# Patient Record
Sex: MALE | Race: WHITE
[De-identification: names, ages, dates, MRNs, and addresses within clinical notes are randomized per-mention and may not be internally consistent; named-entity substitution may affect disease eponyms.]

---

## 2020-03-05 ENCOUNTER — HOSPITAL ENCOUNTER (OUTPATIENT)
Dept: HOSPITAL 75 - SDC | Age: 45
Discharge: HOME | End: 2020-03-05
Attending: SURGERY
Payer: COMMERCIAL

## 2020-03-05 VITALS — SYSTOLIC BLOOD PRESSURE: 106 MMHG | DIASTOLIC BLOOD PRESSURE: 73 MMHG

## 2020-03-05 VITALS — WEIGHT: 175.27 LBS | BODY MASS INDEX: 25.96 KG/M2 | HEIGHT: 68.9 IN

## 2020-03-05 VITALS — DIASTOLIC BLOOD PRESSURE: 77 MMHG | SYSTOLIC BLOOD PRESSURE: 113 MMHG

## 2020-03-05 VITALS — DIASTOLIC BLOOD PRESSURE: 91 MMHG | SYSTOLIC BLOOD PRESSURE: 129 MMHG

## 2020-03-05 VITALS — DIASTOLIC BLOOD PRESSURE: 64 MMHG | SYSTOLIC BLOOD PRESSURE: 117 MMHG

## 2020-03-05 VITALS — SYSTOLIC BLOOD PRESSURE: 127 MMHG | DIASTOLIC BLOOD PRESSURE: 92 MMHG

## 2020-03-05 VITALS — DIASTOLIC BLOOD PRESSURE: 89 MMHG | SYSTOLIC BLOOD PRESSURE: 129 MMHG

## 2020-03-05 VITALS — DIASTOLIC BLOOD PRESSURE: 82 MMHG | SYSTOLIC BLOOD PRESSURE: 115 MMHG

## 2020-03-05 VITALS — SYSTOLIC BLOOD PRESSURE: 117 MMHG | DIASTOLIC BLOOD PRESSURE: 86 MMHG

## 2020-03-05 VITALS — SYSTOLIC BLOOD PRESSURE: 119 MMHG | DIASTOLIC BLOOD PRESSURE: 83 MMHG

## 2020-03-05 VITALS — DIASTOLIC BLOOD PRESSURE: 90 MMHG | SYSTOLIC BLOOD PRESSURE: 120 MMHG

## 2020-03-05 DIAGNOSIS — K40.90: ICD-10-CM

## 2020-03-05 DIAGNOSIS — Z79.899: ICD-10-CM

## 2020-03-05 DIAGNOSIS — Z83.3: ICD-10-CM

## 2020-03-05 DIAGNOSIS — K42.0: Primary | ICD-10-CM

## 2020-03-05 DIAGNOSIS — F17.210: ICD-10-CM

## 2020-03-05 DIAGNOSIS — G43.909: ICD-10-CM

## 2020-03-05 DIAGNOSIS — Z82.49: ICD-10-CM

## 2020-03-05 DIAGNOSIS — D17.6: ICD-10-CM

## 2020-03-05 LAB
BASOPHILS # BLD AUTO: 0 10^3/UL (ref 0–0.1)
BASOPHILS NFR BLD AUTO: 1 % (ref 0–10)
EOSINOPHIL # BLD AUTO: 0.2 10^3/UL (ref 0–0.3)
EOSINOPHIL NFR BLD AUTO: 3 % (ref 0–10)
ERYTHROCYTE [DISTWIDTH] IN BLOOD BY AUTOMATED COUNT: 11.9 % (ref 10–14.5)
HCT VFR BLD CALC: 42 % (ref 40–54)
HGB BLD-MCNC: 15.1 G/DL (ref 13.3–17.7)
LYMPHOCYTES # BLD AUTO: 1.7 X 10^3 (ref 1–4)
LYMPHOCYTES NFR BLD AUTO: 26 % (ref 12–44)
MANUAL DIFFERENTIAL PERFORMED BLD QL: NO
MCH RBC QN AUTO: 32 PG (ref 25–34)
MCHC RBC AUTO-ENTMCNC: 36 G/DL (ref 32–36)
MCV RBC AUTO: 90 FL (ref 80–99)
MONOCYTES # BLD AUTO: 0.6 X 10^3 (ref 0–1)
MONOCYTES NFR BLD AUTO: 9 % (ref 0–12)
NEUTROPHILS # BLD AUTO: 3.8 X 10^3 (ref 1.8–7.8)
NEUTROPHILS NFR BLD AUTO: 61 % (ref 42–75)
PLATELET # BLD: 219 10^3/UL (ref 130–400)
PMV BLD AUTO: 10.4 FL (ref 7.4–10.4)
WBC # BLD AUTO: 6.3 10^3/UL (ref 4.3–11)

## 2020-03-05 PROCEDURE — 85025 COMPLETE CBC W/AUTO DIFF WBC: CPT

## 2020-03-05 PROCEDURE — 88302 TISSUE EXAM BY PATHOLOGIST: CPT

## 2020-03-05 PROCEDURE — 36415 COLL VENOUS BLD VENIPUNCTURE: CPT

## 2020-03-05 PROCEDURE — 87081 CULTURE SCREEN ONLY: CPT

## 2020-03-05 PROCEDURE — 94664 DEMO&/EVAL PT USE INHALER: CPT

## 2020-03-05 PROCEDURE — 88304 TISSUE EXAM BY PATHOLOGIST: CPT

## 2020-03-05 RX ADMIN — SODIUM CHLORIDE, SODIUM LACTATE, POTASSIUM CHLORIDE, AND CALCIUM CHLORIDE PRN MLS/HR: 600; 310; 30; 20 INJECTION, SOLUTION INTRAVENOUS at 07:45

## 2020-03-05 RX ADMIN — SODIUM CHLORIDE, SODIUM LACTATE, POTASSIUM CHLORIDE, AND CALCIUM CHLORIDE PRN MLS/HR: 600; 310; 30; 20 INJECTION, SOLUTION INTRAVENOUS at 09:46

## 2020-03-05 NOTE — PROGRESS NOTE
Progress Note


Assessment/Plan


Date Seen by Provider:  Mar 5, 2020


Time Seen by Provider:  15:32


Events since last exam


I have not seen this patient,, Dr. Clark called to report that he had operated 

on this patient this morning, inadvertently forgot to sign the hydrocodone 

7.5/325 prescription quantity of 30. He is unable to E prescribe as he has not 

yet been signed up for that. As such I transmitted a prescription for the 

aforementioned medication to [facility] pharmacy for [place] on Dr. Clark's behalf.


Assessment/Plan


As above





Vitals


Last set of Vitals Signs





Vital Signs








  Date Time  Temp Pulse Resp B/P (MAP) Pulse Ox O2 Delivery O2 Flow Rate FiO2


 


3/5/20 12:30 36.3 80 18 117/86 98 Room Air 0.00 











Labs


Laboratory Tests


3/5/20 06:30: 


White Blood Count 6.3, Red Blood Count 4.72, Hemoglobin 15.1, Hematocrit 42, 

Mean Corpuscular Volume 90, Mean Corpuscular Hemoglobin 32, Mean Corpuscular 

Hemoglobin Concent 36, Red Cell Distribution Width 11.9, Platelet Count 219, M

brooklynn Platelet Volume 10.4, Neutrophils (%) (Auto) 61, Lymphocytes (%) (Auto) 26, 

Monocytes (%) (Auto) 9, Eosinophils (%) (Auto) 3, Basophils (%) (Auto) 1, 

Neutrophils # (Auto) 3.8, Lymphocytes # (Auto) 1.7, Monocytes # (Auto) 0.6, 

Eosinophils # (Auto) 0.2, Basophils # (Auto) 0.0











CECILY MCCLAIN              Mar 5, 2020 15:34

## 2020-03-05 NOTE — PROGRESS NOTE-PRE OPERATIVE
Pre-Operative Progress Note


H&P Reviewed


The H&P was reviewed, patient examined and no changes noted.


Date Seen by Provider:  Mar 5, 2020


Time Seen by Provider:  07:42


Date H&P Reviewed:  Mar 5, 2020


Time H&P Reviewed:  07:42


Pre-Operative Diagnosis:  umbilical, right inguinal hernia











MADHU CHRISTIANSON DO               Mar 5, 2020 08:01

## 2020-03-05 NOTE — PROGRESS NOTE-POST OPERATIVE
Post-Operative Progess Note


Surgeon (s)/Assistant (s)


Surgeon


MADHU CHRISTIANSON DO


Assistant:  Dr. Chilel to assist in retraction dissection and closure.





Pre-Operative Diagnosis


umbilical, right inguinal hernia





Post-Operative Diagnosis





incarcerate umbilical hernia, right inguinal hernia, right cord lipoma





Procedure & Operative Findings


Date of Procedure


3/5/20


Procedure Performed/Findings


lap incarcerated umbilical hernia repair, open right inguinal hernia repair with

mesh and excision of cord lipoma.





PROCEDURE:


 lap incarcerated umbilical hernia repair, open right inguinal hernia repair 

with mesh and excision of cord lipoma.





COMPLICATIONS:


None. 


 


INDICATIONS:


The patient is a 44, male with an umbilical and right inguinal hernia, which


has continued to increase in size and cause discomfort. The


patient was explained the risk and benefits of the procedure and


wished to proceed with the procedure. Consent was signed on the


chart. 


 


DESCRIPTION OF PROCEDURE:


The patient was taken into the operating suite, prepped and draped in sterile


fashion.  Surgical pause was performed.  Local anesthetic was infiltrated in


left upper quadrant.  A 11 blade scalpel was used to make a small skin incision.


Cautery was used to dissect down to the fascia, which was then scored and


divided the muscle, went through the posterior sheath and a balloon trocar was


inserted into the abdomen.  The abdomen was then insufflated. Incarcerated 

umbilical


hernia and right inguinal hernia is present. 


A 5 mm trocar was placed in the right lower quadrant and a


5 mm trocar was placed in left lower quadrant. Using cautery and endoscisors the

umbilical


hernia contents were dissecetd free and removed. 4.5 inch Echo Ventralight mesh 

was 


then inserted in the abdomen grabbed through


the stab incision. The balloon was inflated on the mesh.


Circumferential tacks were placed with a SecureStrap Tacker.  


The balloon was then removed and inner crown was created as well.  


The mesh was tacked with pressure being decreased.


The 12 mm fascial defect was then closed using 0 Vicryl.


The abdomen was then desufflated,the trocars were removed.  


The skin was then closed using 4-0 Monocryl in a running subcuticular fashion.  


 Local anesthetic was infiltrated in the right lower quadrant.  Incision was 

made and cautery used to dissect down to the


external oblique, which was then opened down through the external ring.  The


spermatic cord was then dissected around.  A Penrose drain was placed around it


and there was no direct defect present.  A indirect hernia present and cord 

lipoma.


which was then dissected off of spermatic cord.  The hernia sac opened and had 

fat that was incorporated


into sac and could not be dissected off.  The  sac was then closed with vicryl 

and the sac and contents were 


reduced back into the abdominal cavity and the defect was closed with 0- vicryl.


 Using ProGrip mesh, this was secured at Sen's ligament and then


incorporated around the spermatic cord and placed under the external oblique. 


Hemostasis was achieved.  Wound irrigated and suctioned. The external oblique 

was then closed recreating the


external ring and then the subcutaneous tissues were then reapproximated using


3-0 Vicryl and skin was then closed using 4-0 Monocryl in a subcuticular


fashion.  The abdomen was then washed and dried and Skin Affix was placed over


the incision.  The patient tolerated procedure well without any complications


and taken to recovery room in stable condition.





Anesthesia Type


gen





Estimated Blood Loss


Estimated blood loss (mL):  min





Specimens/Packing


Specimens Removed


umbilical hernia contents











MADHU CHRISTIANSON DO               Mar 5, 2020 09:53

## 2020-03-05 NOTE — DISCHARGE INST-SIMPLE/STANDARD
Discharge Inst-Standard


Discharge Medications


New, Converted or Re-Newed RX:  RX on Chart





Patient Instructions/Follow Up


Plan of Care/Instructions/FU:  


2-3 weeks Eduardo


Activity as Tolerated:  No


Discharge Diet:  Regular Diet


Other Inst to Patient


Follow up Appt:


Make appointment for 2 week.





Instructions:


No lifting greater than 10 pounds.


No strenuous activity. 


May shower in 24 hours, no tub bath or soaking.


Use incentive spirometer at home as directed.


No Smoking





Skin/Wound Care:


You have special glue over your incisions that will fall off on it's own. 





Symptoms to Report:


Appetite Changes, Extremity Discoloration, Numbness/Tingling, Swelling 

Increased, Bleeding Excessive, Eyesight Changes, Pain Increased, Urine Color 

Change, Constipation(Persistent), Fever over 101 degree F, Pain/Pressure in 

chest, Urinating Difficulty, Cough Up/Vomit Blood, Heart Beat Irreg/Pounding, 

Pain/Pressure in jaw, Vaginal Bleeding Increase, Cramps in feet or legs, 

Lightheadedness, Pain/Pressure in shoulder, Diarrhea(Persistent), Memory Changes

Suddenly, Questions/Concerns, Weight gain consecutive days, Dizziness/Fainting, 

Nausea/Vomiting, Shortness of Breath, Weight gain over 2 pounds








If questions or concerns contact your physician 


Or seek help at emergency department.











MADHU CHRISTIANSON DO               Mar 5, 2020 09:57

## 2020-03-09 NOTE — XMS REPORT
Continuity of Care Document

                             Created on: 2020



CARRILLO AVILA

External Reference #: [id number]

: 1975

Sex: Male



Demographics





                          Address                   [address]

[place]  [address]

 

                          Home Phone                [phone] x

 

                          Preferred Language        Unknown

 

                          Marital Status            Unknown

 

                          Mu-ism Affiliation     Unknown

 

                          Race                      Unknown

 

                          Ethnic Group              Unknown





Author





                          Organization              Unknown

 

                          Address                   Unknown

 

                          Phone                     Unavailable



              



Allergies

      



             Active           Description           Code           Type         

  Severity   

                Reaction           Onset           Reported/Identified          

 

Relationship to Patient                 Clinical Status        

 

                Yes             No Known Drug Allergies           [id number]    

       Drug 

Allergy           Unknown           N/A                             2020  

      

                                                             



                  



Medications

      



There is no data.                  



Problems

      



             Date Dx Coded           Attending           Type           Code    

       

Diagnosis                               Diagnosed By        

 

                2020           MADHU CHRISTIANSON DO           Ot            

  Z01.818          

                          ENCOUNTER FOR OTHER PREPROCEDURAL EXAMIN              

      



                  



Procedures

      



There is no data.                  



Results

      



                    Test                Result              Range        

 

                                        Complete blood count (CBC) with automate

d white blood cell (WBC) differential - 

20 06:30         

 

                          Blood leukocytes automated count (number/volume)      

     6.3 10*3/uL          

                                        4.3-11.0        

 

                          Blood erythrocytes automated count (number/volume)    

       4.72 10*6/uL       

                                        4.35-5.85        

 

                    Venous blood hemoglobin measurement (mass/volume)           

15.1 g/dL           

13.3-17.7        

 

                    Blood hematocrit (volume fraction)           42 %           

     40-54        

 

                    Automated erythrocyte mean corpuscular volume           90 [

foz_us]           

80-99        

 

                                        Automated erythrocyte mean corpuscular h

emoglobin (mass per erythrocyte)        

                          32 pg                     25-34        

 

                                        Automated erythrocyte mean corpuscular h

emoglobin concentration measurement 

(mass/volume)             36 g/dL                   32-36        

 

                    Automated erythrocyte distribution width ratio           11.

9 %              10.0-

14.5        

 

                    Automated blood platelet count (count/volume)           219 

10*3/uL           

130-400        

 

                          Automated blood platelet mean volume measurement      

     10.4 [foz_us]        

                                        7.4-10.4        

 

                    Automated blood neutrophils/100 leukocytes           61 %   

             42-75       

 

 

                    Automated blood lymphocytes/100 leukocytes           26 %   

             12-44       

 

 

                    Blood monocytes/100 leukocytes           9 %                

 0-12        

 

                    Automated blood eosinophils/100 leukocytes           3 %    

             0-10        

 

                    Automated blood basophils/100 leukocytes           1 %      

           0-10        

 

                    Blood neutrophils automated count (number/volume)           

3.8 10*3            

1.8-7.8        

 

                    Blood lymphocytes automated count (number/volume)           

1.7 10*3            

1.0-4.0        

 

                    Blood monocytes automated count (number/volume)           0.

6 10*3            

0.0-1.0        

 

                    Automated eosinophil count           0.2 10*3/uL           0

.0-0.3        

 

                    Automated blood basophil count (count/volume)           0.0 

10*3/uL           

0.0-0.1        

 

                                        Methicillin resistant Staphylococcus aur

eus (MRSA) screening culture - 20 

06:30         

 

                          Methicillin resistant Staphylococcus aureus (MRSA) scr

eening culture           

NEG                                     NRG        



                  



Encounters

      



                ACCT No.           Visit Date/Time           Discharge          

 Status         

             Pt. Type           Provider           Facility           Loc./Unit 

          

Complaint        

 

                    [id number]           2020 05:58:00           

020 12:30:00        

                DIS             Outpatient           CHRISTIANSON MADHU HERNANDEZ         

  [facility]           [facility]                       UMBILICAL WITH RIGHT IN

GUINAL 

[place]        

 

                    [id number]           2020 05:37:00           

020 13:15:00        

                DIS             Outpatient           CHRISTIANSON MADHU HERNANDEZ         

  [facility]           PREOP                     UMBILICAL WITH RIGHT IN

GUINAL 

[place]

## 2020-03-09 NOTE — ANESTHESIA-GENERAL POST-OP
General


Significant Intra-Op Events


Notes


addendum 3-5-20 at 1015





Patient Condition


Mental Status/LOC:  Same as Preop


Cardiovascular:  Satisfactory


Nausea/Vomiting:  Absent


Respiratory:  Satisfactory


Pain:  Controlled


Complications:  Absent





Post Op Complications


Complications


None





Follow Up Care/Instructions


Patient Instructions


None needed.





Anesthesia/Patient Condition


Patient Condition


Patient is doing well, no complaints, stable vital signs, no apparent adverse 

anesthesia problems.   


No complications reported per nursing.











TJ WOODWARD CRNA             Mar 9, 2020 08:04

## 2022-09-14 ENCOUNTER — HOSPITAL ENCOUNTER (OUTPATIENT)
Dept: HOSPITAL 75 - RAD | Age: 47
End: 2022-09-14
Attending: PHYSICIAN ASSISTANT
Payer: COMMERCIAL

## 2022-09-14 DIAGNOSIS — R10.11: Primary | ICD-10-CM

## 2022-09-14 LAB
ALBUMIN SERPL-MCNC: 4.5 GM/DL (ref 3.2–4.5)
ALP SERPL-CCNC: 75 U/L (ref 40–136)
ALT SERPL-CCNC: 30 U/L (ref 0–55)
AMYLASE SERPL-CCNC: 63 U/L (ref 25–125)
BASOPHILS # BLD AUTO: 0.1 10^3/UL (ref 0–0.1)
BASOPHILS NFR BLD AUTO: 1 % (ref 0–10)
BILIRUB SERPL-MCNC: 0.3 MG/DL (ref 0.1–1)
BUN/CREAT SERPL: 13
CALCIUM SERPL-MCNC: 9.3 MG/DL (ref 8.5–10.1)
CHLORIDE SERPL-SCNC: 107 MMOL/L (ref 98–107)
CO2 SERPL-SCNC: 21 MMOL/L (ref 21–32)
CREAT SERPL-MCNC: 1.02 MG/DL (ref 0.6–1.3)
EOSINOPHIL # BLD AUTO: 0.2 10^3/UL (ref 0–0.3)
EOSINOPHIL NFR BLD AUTO: 3 % (ref 0–10)
GFR SERPLBLD BASED ON 1.73 SQ M-ARVRAT: 92 ML/MIN
GLUCOSE SERPL-MCNC: 95 MG/DL (ref 70–105)
HCT VFR BLD CALC: 44 % (ref 40–54)
HGB BLD-MCNC: 15.5 G/DL (ref 13.3–17.7)
LIPASE SERPL-CCNC: 26 U/L (ref 8–78)
LYMPHOCYTES # BLD AUTO: 2.3 10^3/UL (ref 1–4)
LYMPHOCYTES NFR BLD AUTO: 31 % (ref 12–44)
MANUAL DIFFERENTIAL PERFORMED BLD QL: NO
MCH RBC QN AUTO: 31 PG (ref 25–34)
MCHC RBC AUTO-ENTMCNC: 35 G/DL (ref 32–36)
MCV RBC AUTO: 90 FL (ref 80–99)
MONOCYTES # BLD AUTO: 0.5 10^3/UL (ref 0–1)
MONOCYTES NFR BLD AUTO: 7 % (ref 0–12)
NEUTROPHILS # BLD AUTO: 4.3 10^3/UL (ref 1.8–7.8)
NEUTROPHILS NFR BLD AUTO: 58 % (ref 42–75)
PLATELET # BLD: 249 10^3/UL (ref 130–400)
PMV BLD AUTO: 10.2 FL (ref 9–12.2)
POTASSIUM SERPL-SCNC: 3.9 MMOL/L (ref 3.6–5)
PROT SERPL-MCNC: 7.5 GM/DL (ref 6.4–8.2)
SODIUM SERPL-SCNC: 141 MMOL/L (ref 135–145)
WBC # BLD AUTO: 7.4 10^3/UL (ref 4.3–11)

## 2022-09-14 PROCEDURE — 82150 ASSAY OF AMYLASE: CPT

## 2022-09-14 PROCEDURE — 83690 ASSAY OF LIPASE: CPT

## 2022-09-14 PROCEDURE — 80053 COMPREHEN METABOLIC PANEL: CPT

## 2022-09-14 PROCEDURE — 76705 ECHO EXAM OF ABDOMEN: CPT

## 2022-09-14 PROCEDURE — 36415 COLL VENOUS BLD VENIPUNCTURE: CPT

## 2022-09-14 PROCEDURE — 85025 COMPLETE CBC W/AUTO DIFF WBC: CPT

## 2022-09-14 NOTE — DIAGNOSTIC IMAGING REPORT
PROCEDURE: US Gallbladder.



TECHNIQUE: Multiple real-time grayscale images were obtained over

the right upper quadrant in various projections.



INDICATION: Abdominal pain, right upper quadrant pain.



FINDINGS: There is a non shadowing 8 mm long axis hyperechoic

structure adherent to the gallbladder wall, non mobile, believed

to be a small polyp. No appreciable stones within the gallbladder

and there is no sludge. Gallbladder is nondilated, its wall is

non thickened. The Phan's sign is negative. Pancreas where

visualized is unremarkable. The aorta and IVC where visualized

are unremarkable. The unobstructed right kidney measured 10 cm

and appeared normal. The liver appeared normal. There is no intra

or extrahepatic bile duct dilatation. The portal vein is patent

and showed normal hepatopetal directional flow.



IMPRESSION: Likely small subcentimeter gallbladder polyp. No

appreciable stone or biliary dilatation. No ascites, fluid

collection, or acute-appearing abnormalities.



Dictated by: 



  Dictated on workstation # [id number]

## 2022-09-26 ENCOUNTER — HOSPITAL ENCOUNTER (OUTPATIENT)
Dept: HOSPITAL 75 - RAD | Age: 47
End: 2022-09-26
Attending: PHYSICIAN ASSISTANT
Payer: COMMERCIAL

## 2022-09-26 DIAGNOSIS — R19.00: ICD-10-CM

## 2022-09-26 DIAGNOSIS — R10.9: Primary | ICD-10-CM

## 2022-09-26 PROCEDURE — 74177 CT ABD & PELVIS W/CONTRAST: CPT

## 2022-09-26 NOTE — DIAGNOSTIC IMAGING REPORT
PROCEDURE: CT abdomen and pelvis with contrast.



TECHNIQUE: Multiple contiguous axial images were obtained through

the abdomen and pelvis after administration of intravenous

contrast. Auto Exposure Controls were utilized during the CT exam

to meet ALARA standards for radiation dose reduction. All CT

scans use one or more of the following dose optimizing

techniques: Automated exposure control, MA and/or KvP adjustment

based on patient size and exam type or iterative reconstruction.



INDICATION: Abdominal pain.



FINDINGS: No focal hepatic, gallbladder, pancreatic, adrenal

gland, or splenic abnormality is identified. The kidneys are

unremarkable in appearance. There is a focal defect in the

anterior abdominal wall slightly to the right of midline just

above the umbilicus. There is protrusion of omental fat with

defect measuring approximately 1 cm in diameter. There is no

bowel hernia or obstruction. The appendix has a normal

appearance. Unopacified bladder is unremarkable. Surgical

findings are seen in the right inguinal region.



IMPRESSION: Small anterior abdominal wall defect with protrusion

of omental fat. There is no bowel hernia or obstruction, and no

other focal acute abnormality is seen in the abdomen or pelvis.



Dictated by: 



  Dictated on workstation # [id number]

## 2022-10-20 ENCOUNTER — HOSPITAL ENCOUNTER (OUTPATIENT)
Dept: HOSPITAL 75 - PREOP | Age: 47
Discharge: HOME | End: 2022-10-20
Attending: SURGERY
Payer: COMMERCIAL

## 2022-10-20 VITALS — WEIGHT: 164.91 LBS | HEIGHT: 67.99 IN | BODY MASS INDEX: 24.99 KG/M2

## 2022-10-20 DIAGNOSIS — Z01.818: Primary | ICD-10-CM

## 2022-10-27 ENCOUNTER — HOSPITAL ENCOUNTER (OUTPATIENT)
Dept: HOSPITAL 75 - SDC | Age: 47
Discharge: HOME | End: 2022-10-27
Attending: SURGERY
Payer: COMMERCIAL

## 2022-10-27 VITALS — SYSTOLIC BLOOD PRESSURE: 115 MMHG | DIASTOLIC BLOOD PRESSURE: 79 MMHG

## 2022-10-27 VITALS — WEIGHT: 164.91 LBS | HEIGHT: 67.99 IN | BODY MASS INDEX: 24.99 KG/M2

## 2022-10-27 VITALS — DIASTOLIC BLOOD PRESSURE: 84 MMHG | SYSTOLIC BLOOD PRESSURE: 117 MMHG

## 2022-10-27 VITALS — DIASTOLIC BLOOD PRESSURE: 82 MMHG | SYSTOLIC BLOOD PRESSURE: 118 MMHG

## 2022-10-27 VITALS — DIASTOLIC BLOOD PRESSURE: 84 MMHG | SYSTOLIC BLOOD PRESSURE: 110 MMHG

## 2022-10-27 VITALS — SYSTOLIC BLOOD PRESSURE: 114 MMHG | DIASTOLIC BLOOD PRESSURE: 76 MMHG

## 2022-10-27 VITALS — DIASTOLIC BLOOD PRESSURE: 75 MMHG | SYSTOLIC BLOOD PRESSURE: 112 MMHG

## 2022-10-27 VITALS — DIASTOLIC BLOOD PRESSURE: 76 MMHG | SYSTOLIC BLOOD PRESSURE: 114 MMHG

## 2022-10-27 VITALS — DIASTOLIC BLOOD PRESSURE: 72 MMHG | SYSTOLIC BLOOD PRESSURE: 112 MMHG

## 2022-10-27 VITALS — SYSTOLIC BLOOD PRESSURE: 118 MMHG | DIASTOLIC BLOOD PRESSURE: 83 MMHG

## 2022-10-27 VITALS — SYSTOLIC BLOOD PRESSURE: 115 MMHG | DIASTOLIC BLOOD PRESSURE: 74 MMHG

## 2022-10-27 DIAGNOSIS — K43.0: Primary | ICD-10-CM

## 2022-10-27 DIAGNOSIS — K66.0: ICD-10-CM

## 2022-10-27 PROCEDURE — 87081 CULTURE SCREEN ONLY: CPT

## 2022-10-27 RX ADMIN — SODIUM CHLORIDE, SODIUM LACTATE, POTASSIUM CHLORIDE, AND CALCIUM CHLORIDE PRN MLS/HR: 600; 310; 30; 20 INJECTION, SOLUTION INTRAVENOUS at 07:16

## 2022-10-27 RX ADMIN — SODIUM CHLORIDE, SODIUM LACTATE, POTASSIUM CHLORIDE, AND CALCIUM CHLORIDE PRN MLS/HR: 600; 310; 30; 20 INJECTION, SOLUTION INTRAVENOUS at 10:15

## 2022-10-27 RX ADMIN — HYDROCODONE BITARTRATE AND ACETAMINOPHEN ONE EA: 5; 325 TABLET ORAL at 12:24

## 2022-10-27 NOTE — PROGRESS NOTE-PRE OPERATIVE
Pre-Operative Progress Note


Date of Available H&P:  Oct 12, 2022


Date H&P Reviewed:  Oct 27, 2022


Time H&P Reviewed:  08:02


History & Physical:  H&P Reviewed, Patient Examed, No changes noted


Pre-Operative Diagnosis:  incisional hernia











MADHU CHRISTIANSON DO              Oct 27, 2022 08:02

## 2022-10-27 NOTE — OPERATIVE REPORT
DATE OF SERVICE:  10/27/2022



PREOPERATIVE DIAGNOSIS:

Incisional hernia.



POSTOPERATIVE DIAGNOSIS:

Recurrent incisional hernia.



PROCEDURE:

Robotic recurrent incarcerated incisional hernia repair, primary repair.



SURGEON:

Madhu Clark DO



ASSISTANT:

Dr. Chilel, assisted in retraction, dissection and closure.



ANESTHESIA:

General.



ESTIMATED BLOOD LOSS:

Minimal.



COMPLICATIONS:

None.



INDICATIONS:

The patient is a 46-year-old male, who has incisional hernia, recurrent.  He

understands risks and benefits of procedure and wishes to proceed.  Consent was

signed in the chart.



DESCRIPTION OF PROCEDURE:

The patient was taken to the operating suite, was prepped and draped in sterile

fashion.  Timeout was performed.  Local anesthetic was infiltrated in left upper

quadrant.  An 11 blade scalpel was used to make a small skin incision.  Cautery

was used to dissect down to the anterior fascia, which was then scored, the

muscle was divided.  The posterior fascia was divided and the abdomen was then

entered.  A key trocar was inserted.  Pneumoperitoneum was achieved.  Under

direct visualization of the laparoscope, 8 mm trocar was placed in left lower

quadrant and then triangulating a third 8 mm trocar was placed as well.  The

robot was then docked.  Scissors were began using take down adhesions that were

_____ up to the mesh.  These were taken down.  There was herniation of the

falciform under the mesh into the hernia.  This was incarcerated, which we began

with scissors and a bipolar forceps to be reduced and dissected around until

completely removed.  The falciform was taken down and at this point, we using 0

V-Loc permanent suture, the mesh was secured to the anterior fascia closing the

defect.  The robot was then undocked.  The trocars were removed.  The 12 mm

fascial defect was then closed with 0 Vicryl in a figure-of-eight fashion.  Skin

was then closed using 4-0 Monocryl in a subcuticular fashion.  The abdomen was

then washed and dried and Skin Affix was placed over the incisions.  The patient

tolerated procedure well without any complications.  He was taken to recovery

room in stable condition.





Job ID: [id number]

DocumentID: [id number]

Dictated Date:  10/27/2022 16:29:15

Transcription Date: 10/27/2022 22:34:30

Dictated By: MADHU CLARK DO

## 2022-10-27 NOTE — DISCHARGE INST-SIMPLE/STANDARD
Discharge Inst-Standard


Discharge Medications


New, Converted or Re-Newed RX:  Transmitted to Pharmacy





Patient Instructions/Follow Up


Plan of Care/Instructions/FU:  


2 weeks Eduardo


Activity as Tolerated:  No


Discharge Diet:  Regular Diet


Other Inst to Patient


Follow up Appt:


Make appointment for 2 week.





Instructions:


No lifting greater than 10 pounds.


No strenuous activity. 


May shower in 24 hours, no tub bath or soaking.


Use incentive spirometer at home as directed.


No Smoking





Skin/Wound Care:


You have special glue over your incision that will fall off on it's own. 





Symptoms to Report:


Appetite Changes, Extremity Discoloration, Numbness/Tingling, Swelling 

Increased, Bleeding Excessive, Eyesight Changes, Pain Increased, Urine Color 

Change, Constipation(Persistent), Fever over 101 degree F, Pain/Pressure in 

chest, Urinating Difficulty, Cough Up/Vomit Blood, Heart Beat Irreg/Pounding, 

Pain/Pressure in jaw, Vaginal Bleeding Increase, Cramps in feet or legs, 

Lightheadedness, Pain/Pressure in shoulder, Diarrhea(Persistent), Memory Changes

Suddenly, Questions/Concerns, Weight gain consecutive days, Dizziness/Fainting, 

Nausea/Vomiting, Shortness of Breath, Weight gain over 2 pounds








If questions or concerns contact your physician 


Or seek help at emergency department.











MADHU CHRISTIANSON DO              Oct 27, 2022 10:56

## 2022-10-27 NOTE — ANESTHESIA-GENERAL POST-OP
General


Patient Condition


Mental Status/LOC:  Same as Preop


Cardiovascular:  Satisfactory


Nausea/Vomiting:  Absent


Respiratory:  Satisfactory


Pain:  Controlled


Complications:  Absent





Post Op Complications


Complications


None





Follow Up Care/Instructions


Patient Instructions


None needed.





Anesthesia/Patient Condition


Patient Condition


Patient is doing well, no complaints, stable vital signs, no apparent adverse 

anesthesia problems.   


No complications reported per nursing.











SRINIVAS SARAVIA CRNA          Oct 27, 2022 13:29